# Patient Record
Sex: MALE | NOT HISPANIC OR LATINO | Employment: FULL TIME | URBAN - METROPOLITAN AREA
[De-identification: names, ages, dates, MRNs, and addresses within clinical notes are randomized per-mention and may not be internally consistent; named-entity substitution may affect disease eponyms.]

---

## 2022-05-16 ENCOUNTER — TELEPHONE (OUTPATIENT)
Dept: PHYSICAL MEDICINE AND REHAB | Facility: CLINIC | Age: 27
End: 2022-05-16
Payer: COMMERCIAL

## 2022-05-16 NOTE — TELEPHONE ENCOUNTER
Left detailed message for pt. To call back and confirm of the rescheduled Video appt from 7/6/22 to 8/3/22 at 2:15   Due to Provider not in clinic.    Thanks ,  Graham

## 2022-08-03 ENCOUNTER — TELEPHONE (OUTPATIENT)
Dept: PHYSICAL MEDICINE AND REHAB | Facility: CLINIC | Age: 27
End: 2022-08-03

## 2022-08-03 NOTE — TELEPHONE ENCOUNTER
LVM  For pt to call back to proceed for checking in for his virtual visit with Dr Matthews.    Thanks,   malou  [phone]

## 2023-03-22 ENCOUNTER — HOSPITAL ENCOUNTER (EMERGENCY)
Facility: CLINIC | Age: 28
Discharge: HOME | End: 2023-03-22
Attending: EMERGENCY MEDICINE | Admitting: EMERGENCY MEDICINE
Payer: COMMERCIAL

## 2023-03-22 ENCOUNTER — APPOINTMENT (OUTPATIENT)
Dept: CT IMAGING | Facility: CLINIC | Age: 28
End: 2023-03-22
Attending: EMERGENCY MEDICINE
Payer: COMMERCIAL

## 2023-03-22 VITALS
HEART RATE: 64 BPM | HEIGHT: 70 IN | SYSTOLIC BLOOD PRESSURE: 128 MMHG | DIASTOLIC BLOOD PRESSURE: 67 MMHG | RESPIRATION RATE: 16 BRPM | TEMPERATURE: 97.5 F | WEIGHT: 230 LBS | OXYGEN SATURATION: 96 % | BODY MASS INDEX: 32.93 KG/M2

## 2023-03-22 DIAGNOSIS — R51.9 CHRONIC NONINTRACTABLE HEADACHE, UNSPECIFIED HEADACHE TYPE: ICD-10-CM

## 2023-03-22 DIAGNOSIS — G89.29 CHRONIC NONINTRACTABLE HEADACHE, UNSPECIFIED HEADACHE TYPE: ICD-10-CM

## 2023-03-22 LAB
ALBUMIN SERPL BCG-MCNC: 4.6 G/DL (ref 3.5–5.2)
ALP SERPL-CCNC: 60 U/L (ref 40–129)
ALT SERPL W P-5'-P-CCNC: 40 U/L (ref 10–50)
ANION GAP SERPL CALCULATED.3IONS-SCNC: 9 MMOL/L (ref 7–15)
AST SERPL W P-5'-P-CCNC: 28 U/L (ref 10–50)
BASOPHILS # BLD AUTO: 0 10E3/UL (ref 0–0.2)
BASOPHILS NFR BLD AUTO: 1 %
BILIRUB SERPL-MCNC: 1.2 MG/DL
BUN SERPL-MCNC: 11.4 MG/DL (ref 6–20)
CALCIUM SERPL-MCNC: 9.5 MG/DL (ref 8.6–10)
CHLORIDE SERPL-SCNC: 104 MMOL/L (ref 98–107)
CREAT SERPL-MCNC: 1.06 MG/DL (ref 0.67–1.17)
DEPRECATED HCO3 PLAS-SCNC: 25 MMOL/L (ref 22–29)
EOSINOPHIL # BLD AUTO: 0.1 10E3/UL (ref 0–0.7)
EOSINOPHIL NFR BLD AUTO: 1 %
ERYTHROCYTE [DISTWIDTH] IN BLOOD BY AUTOMATED COUNT: 13.3 % (ref 10–15)
GFR SERPL CREATININE-BSD FRML MDRD: >90 ML/MIN/1.73M2
GLUCOSE SERPL-MCNC: 97 MG/DL (ref 70–99)
HCT VFR BLD AUTO: 45 % (ref 40–53)
HGB BLD-MCNC: 15.9 G/DL (ref 13.3–17.7)
HOLD SPECIMEN: NORMAL
HOLD SPECIMEN: NORMAL
IMM GRANULOCYTES # BLD: 0 10E3/UL
IMM GRANULOCYTES NFR BLD: 0 %
LYMPHOCYTES # BLD AUTO: 2.1 10E3/UL (ref 0.8–5.3)
LYMPHOCYTES NFR BLD AUTO: 50 %
MCH RBC QN AUTO: 29.9 PG (ref 26.5–33)
MCHC RBC AUTO-ENTMCNC: 35.3 G/DL (ref 31.5–36.5)
MCV RBC AUTO: 85 FL (ref 78–100)
MONOCYTES # BLD AUTO: 0.4 10E3/UL (ref 0–1.3)
MONOCYTES NFR BLD AUTO: 9 %
NEUTROPHILS # BLD AUTO: 1.6 10E3/UL (ref 1.6–8.3)
NEUTROPHILS NFR BLD AUTO: 39 %
NRBC # BLD AUTO: 0 10E3/UL
NRBC BLD AUTO-RTO: 0 /100
PLATELET # BLD AUTO: 211 10E3/UL (ref 150–450)
POTASSIUM SERPL-SCNC: 4.1 MMOL/L (ref 3.4–5.3)
PROT SERPL-MCNC: 7.5 G/DL (ref 6.4–8.3)
RBC # BLD AUTO: 5.31 10E6/UL (ref 4.4–5.9)
SODIUM SERPL-SCNC: 138 MMOL/L (ref 136–145)
WBC # BLD AUTO: 4.2 10E3/UL (ref 4–11)

## 2023-03-22 PROCEDURE — 250N000011 HC RX IP 250 OP 636: Performed by: EMERGENCY MEDICINE

## 2023-03-22 PROCEDURE — 99285 EMERGENCY DEPT VISIT HI MDM: CPT | Mod: 25

## 2023-03-22 PROCEDURE — 70450 CT HEAD/BRAIN W/O DYE: CPT

## 2023-03-22 PROCEDURE — 85025 COMPLETE CBC W/AUTO DIFF WBC: CPT | Performed by: EMERGENCY MEDICINE

## 2023-03-22 PROCEDURE — 96374 THER/PROPH/DIAG INJ IV PUSH: CPT

## 2023-03-22 PROCEDURE — 80053 COMPREHEN METABOLIC PANEL: CPT | Performed by: EMERGENCY MEDICINE

## 2023-03-22 PROCEDURE — 36415 COLL VENOUS BLD VENIPUNCTURE: CPT | Performed by: EMERGENCY MEDICINE

## 2023-03-22 RX ORDER — CALCIPOTRIENE 50 UG/G
1 OINTMENT TOPICAL DAILY
COMMUNITY
Start: 2022-11-30

## 2023-03-22 RX ORDER — BETAMETHASONE DIPROPIONATE 0.05 %
1 OINTMENT (GRAM) TOPICAL DAILY
COMMUNITY
Start: 2022-11-30

## 2023-03-22 RX ORDER — EMTRICITABINE AND TENOFOVIR DISOPROXIL FUMARATE 200; 300 MG/1; MG/1
1 TABLET, FILM COATED ORAL DAILY
COMMUNITY
Start: 2022-03-31

## 2023-03-22 RX ORDER — GABAPENTIN 100 MG/1
100 CAPSULE ORAL 2 TIMES DAILY
Qty: 90 CAPSULE | Refills: 0 | Status: SHIPPED | OUTPATIENT
Start: 2023-03-22

## 2023-03-22 RX ORDER — KETOROLAC TROMETHAMINE 15 MG/ML
15 INJECTION, SOLUTION INTRAMUSCULAR; INTRAVENOUS ONCE
Status: COMPLETED | OUTPATIENT
Start: 2023-03-22 | End: 2023-03-22

## 2023-03-22 RX ORDER — CETIRIZINE HYDROCHLORIDE 10 MG/1
10 TABLET ORAL DAILY
COMMUNITY

## 2023-03-22 RX ADMIN — KETOROLAC TROMETHAMINE 15 MG: 15 INJECTION, SOLUTION INTRAMUSCULAR; INTRAVENOUS at 13:34

## 2023-03-22 ASSESSMENT — ACTIVITIES OF DAILY LIVING (ADL)
ADLS_ACUITY_SCORE: 35
ADLS_ACUITY_SCORE: 35

## 2023-03-22 ASSESSMENT — ENCOUNTER SYMPTOMS
WEAKNESS: 0
HEADACHES: 1
SPEECH DIFFICULTY: 0
SINUS PRESSURE: 1
NUMBNESS: 0

## 2023-03-22 NOTE — ED TRIAGE NOTES
Pt presents with 1 month of headache. Pt had COVID in December and has had multiple health issues since including syphilis, and Bels Palsy that was diagnosed early February. Multiple hospitalizations since that time.      Triage Assessment     Row Name 03/22/23 1151       Triage Assessment (Adult)    Airway WDL WDL       Respiratory WDL    Respiratory WDL WDL       Skin Circulation/Temperature WDL    Skin Circulation/Temperature WDL WDL       Cardiac WDL    Cardiac WDL WDL       Peripheral/Neurovascular WDL    Peripheral Neurovascular WDL WDL       Cognitive/Neuro/Behavioral WDL    Cognitive/Neuro/Behavioral WDL WDL

## 2023-03-22 NOTE — ED PROVIDER NOTES
History     Chief Complaint:  Headache     HPI   Nikolay Salicdo is a 27 year old male with a history of secondary syphilis who presents with an ongoing persisting intermittent headaches. Patient is on Truvada which requires quarterly blood work, and he typically does this through [facility]. In the first week of December the patient was prescribed Finasteride and Minoxidil for hair loss as well as two topical ointments. Two days later the patient tested positive for COVID-19 and reports a primary symptom of a severe headache with no respiratory issues. He noticed a lesion on his scrotum at the end of December as well as a systemic rash developing in the end of January. [facility] and given a double IM penicillin injection the next day he noticed mild Bell's palsy at work. He traveled to his family and went to [facility] in [place] on February 2nd where he stayed overnight and was given a lumbar puncture. He tested positive for Lyme's disease, but his infectious disease doctor re-ran the test, and it came back negative. Patient states his doctor has been considering neurosyphilis, and he was given a central line to begin a 2 week course of IV penicillin infusions; he finished his treatment on February 15.    Patient reports that his headaches were alleviated while on antibiotics but have since started coming back.  He rates headaches are global all over his head and come and go.  His headaches rate 4 on a scale of 1-10 when they are present.  He stopped taking finasteride and minoxidil 2-3 weeks ago. Patient endorses some sinus pressure. He has been taking ibuprofen for his headaches. Denies numbness, weakness, vision changes, or slurred speech. He reports a family history of stroke.  He denies neck pain or stiffness.  He denies fevers or chills.    Independent Historian:   None - Patient Only    Review of External  "Notes: I reviewed Care Everywhere records.    ROS:  Review of Systems   HENT: Positive for sinus pressure.    Neurological: Positive for headaches. Negative for speech difficulty, weakness and numbness.   All other systems reviewed and are negative.    Allergies:  The patient has no known allergies to medications.    Medications:    Truvada  Zofran  Zyrtec  Percocet   Finasteride  Minoxidil    Past Medical History:    Secondary syphilis     Social History:  Patient presents on his own.  PCP: No Ref-Primary, Physician     Physical Exam     Patient Vitals for the past 24 hrs:   BP Temp Temp src Pulse Resp SpO2 Height Weight   03/22/23 1531 128/67 -- -- 64 -- 96 % -- --   03/22/23 1233 (!) 140/74 -- -- 73 16 96 % -- --   03/22/23 1141 132/70 97.5  F (36.4  C) Temporal 65 20 97 % 1.778 m (5' 10\") 104.3 kg (230 lb)        Physical Exam  Nursing note and vitals reviewed.  Constitutional:  Appears well-developed and well-nourished.   HENT:   Head:    Atraumatic.   Mouth/Throat:   Oropharynx is clear and moist. No oropharyngeal exudate.   Eyes:    Pupils are equal, round, and reactive to light.   Neck:    Normal range of motion. Neck supple.      No tracheal deviation present. No thyromegaly present.   Cardiovascular:  Normal rate, regular rhythm, no murmur   Pulmonary/Chest: Breath sounds are clear and equal without wheezes or crackles.  Abdominal:   Soft. Bowel sounds are normal. Exhibits no distension and      no mass. There is no tenderness.      There is no rebound and no guarding.   Musculoskeletal:  Exhibits no edema.   Lymphadenopathy:  No cervical adenopathy.   Neurological:   Alert and oriented to person, place, and time. GCS 15.  CN 2-12 intact.  and proximal upper extremity strength strong and equal.  Bilateral lower extremity strength strong and equal, including strong dorsiflexion and plantarflexion strength.  Sensation intact and equal to the face, arms and legs.  No facial droop or weakness. Normal " speech.  Follows commands and answers questions normally.    Skin:    Skin is warm and dry. No rash noted. No pallor.     Emergency Department Course     Imaging:  Head CT w/o contrast   Final Result   IMPRESSION:  No acute intracranial abnormality.      PARMINDER LUO MD            SYSTEM ID:  [id number]         Report per radiology    Laboratory:  Labs Ordered and Resulted from Time of ED Arrival to Time of ED Departure   COMPREHENSIVE METABOLIC PANEL - Normal       Result Value    Sodium 138      Potassium 4.1      Chloride 104      Carbon Dioxide (CO2) 25      Anion Gap 9      Urea Nitrogen 11.4      Creatinine 1.06      Calcium 9.5      Glucose 97      Alkaline Phosphatase 60      AST 28      ALT 40      Protein Total 7.5      Albumin 4.6      Bilirubin Total 1.2      GFR Estimate >90     CBC WITH PLATELETS AND DIFFERENTIAL    WBC Count 4.2      RBC Count 5.31      Hemoglobin 15.9      Hematocrit 45.0      MCV 85      MCH 29.9      MCHC 35.3      RDW 13.3      Platelet Count 211      % Neutrophils 39      % Lymphocytes 50      % Monocytes 9      % Eosinophils 1      % Basophils 1      % Immature Granulocytes 0      NRBCs per 100 WBC 0      Absolute Neutrophils 1.6      Absolute Lymphocytes 2.1      Absolute Monocytes 0.4      Absolute Eosinophils 0.1      Absolute Basophils 0.0      Absolute Immature Granulocytes 0.0      Absolute NRBCs 0.0        Emergency Department Course & Assessments:       Interventions:  Medications   ketorolac (TORADOL) injection 15 mg (15 mg Intravenous $Given 3/22/23 1334)        Assessments:  1249 I entered the patient's room and obtained history.  1513 I rechecked the patient and explained findings.  1605 I rechecked the patient. I believe that they are safe for discharge at this time.    Independent Interpretation (X-rays, CTs, rhythm strip):  none    Consultations/Discussion of Management or Tests:  I reviewed his CT head images and do not see any abnormalities.       Social  Determinants of Health affecting care:   Healthcare Access/Compliance    Disposition:  The patient was discharged to home.     Impression & Plan    CMS Diagnoses: None    Medical Decision Making:  This patient has been having chronic headaches for the last several months after being treated with IV penicillin for 2 weeks for possible neurosyphilis after he had developed Bell's palsy which he developed on February 1.  He does not have any signs of acute meningitis, encephalitis, stroke central venous thrombus or vertebral artery dissection.  He does not have any neurological deficits or complaints.  His headaches have been intermittent and not constant and he does not have any fever or new complaints.  I was initially going to perform an MRI of his brain since it sounds like he has not had an MRI during the past few months of his illnesses, however I discussed with the patient that there was a long wait for MRI here through the emergency department due to multiple patients with emergent MRI is being ordered for acute stroke and he was given the option of waiting for an MRI or having the CT performed which would be quicker and then he could follow-up with neurology and have an outpatient MRI.  The patient elected to have the CT performed which was normal and agreed to follow-up with neurology as an outpatient.  He was referred to [facility] and also told establish care with a primary care physician.  I felt he be safely discharged home at this time and he was told to return if symptoms worsen.  He was prescribed gabapentin to see if this would help with his headaches and told that he can continue ibuprofen or Tylenol as needed for pain.    Diagnosis:    ICD-10-CM    1. Chronic nonintractable headache, unspecified headache type  R51.9     G89.29            Discharge Medications:  Discharge Medication List as of 3/22/2023  4:24 PM      START taking these medications    Details   gabapentin  (NEURONTIN) 100 MG capsule Take 1 capsule (100 mg) by mouth 2 times daily (Start at a dose of 100 mg twice daily X 2 days, then increase to 100 mg TID), Disp-90 capsule, R-0, E-Prescribe            Scribe Disclosure:  HILLARY Manedeidre Gordoned, am serving as a scribe at 12:42 PM on 3/22/2023 to document services personally performed by Earlene Pereyra MD based on my observations and the provider's statements to me.     3/22/2023   Earlene Pereyra MD [place], Cheri Lee, MD  03/22/23 1732       Earlene Pereyra MD  03/22/23 1735

## 2023-05-21 ENCOUNTER — HEALTH MAINTENANCE LETTER (OUTPATIENT)
Age: 28
End: 2023-05-21

## 2024-07-28 ENCOUNTER — HEALTH MAINTENANCE LETTER (OUTPATIENT)
Age: 29
End: 2024-07-28

## 2025-08-10 ENCOUNTER — HEALTH MAINTENANCE LETTER (OUTPATIENT)
Age: 30
End: 2025-08-10